# Patient Record
Sex: FEMALE | Race: WHITE | Employment: UNEMPLOYED | URBAN - NONMETROPOLITAN AREA
[De-identification: names, ages, dates, MRNs, and addresses within clinical notes are randomized per-mention and may not be internally consistent; named-entity substitution may affect disease eponyms.]

---

## 2020-05-21 ENCOUNTER — HOSPITAL ENCOUNTER (OUTPATIENT)
Dept: PHYSICAL THERAPY | Age: 60
Setting detail: THERAPIES SERIES
Discharge: HOME | End: 2020-05-21
Payer: MEDICAID

## 2020-05-21 PROCEDURE — 97163 PT EVAL HIGH COMPLEX 45 MIN: CPT

## 2020-05-21 NOTE — FLOWSHEET NOTE
** PLEASE SIGN, DATE AND TIME CERTIFICATION BELOW AND RETURN TO [facility] OUTPATIENT REHABILITATION (FAX #: [phone]). ATTEST/CO-SIGN IF ACCESSING VIA [employer]. THANK YOU.**    I certify that I have examined the patient below and determined that Physical Medicine and Rehabilitation service is necessary and that I approve the established plan of care for up to 90 days or as specifically noted. Attestation, signature or co-signature of physician indicates approval of certification requirements.    ________________________ ____________ __________  Physician Signature   Date   Time    [address] [facility]  LYMPHEDEMA SERVICES EVALUATION      Time In: 825  Time Out:   Total timed code minutes: 0  Total treatment minutes: 62  Therapy Whiteboard: LYMPHEDEMA LIFE IMPACT SCALE (Version 2): 36 (53%)    Date: 2020  Patient Name: Yuan Cespedes        CSN: [id number]   : 1960  (63 y.o.)  Gender: female   Referring Practitioner: DR. Yandy Bhatti, DPM  Diagnosis: LYMPHEDEMA HEREDITARY (Q82.0)  Treatment Diagnosis: Difficulty with prolonged standing and walking. Additional Pertinent Hx: The patient reported that in  she was hospitalized due to wounds and infection. She was diagnosed with Hereditary Lymphedema and underwent treatment. Insurance:  [employer]   Total # Visits Approved: (NO PRECERT UNTIL AFTER 80PV VISIT.) Total # Visits to Date: 1   Certification Date:  Progress Note Counter:1/10 (INITIAL EVALUATION)   Onset Date:  Hereditary Chart Reviewed: Yes   Date of Physician Follow-Up:  -- --     Pertinent Medical History:  No history of CHF, acute DVT, Diabetes, kidney problems or acute infections    History of Cancer:       No history of cancer    Prior Lymphedema Treatment:  Received Complete Decongestive Therapy/Manual Lymph Drainage.   Dates of Treatment:   Compression stockings (worn daily), application of compression bandages (nightly)  Compression pump (2014)  Elevation (Minimal benefits)-Adjustable bed with the ability to elevate lower extremities. Restrictions/Precautions:  Fall risk, Universal precautions    Pain:    -LOCATION: Bilateral lower extremities. -RATING (On 0-10 pain scale; 0 = no pain; 10 = worst pain):  5/10  -DESCRIPTORS: Aching and Heaviness. SUBJECTIVE:     The patient is a pleasant 63year old female who presented to the Lymphedema clinic on this date with severe bilateral lower extremity and lower truncal Lymphedema swelling. She reported that she has been working on her home Lymphedema program including self-MLD, Skin care, wearing compression stockings daily. She stated that the swelling has gotten worse and she is having more difficulty with keeping the swelling down which is leading to decrease in functional mobility. OBJECTIVE  Social/Functional History and Current Status:  Justo Dunbar lives alone in single story with basement with stairs to enter with handrails. Task Previous Current   LOWER EXTREMITY  Range of Motion Deficit  Large lobes at knee and ankle regions. Deficit  Large lobes at knee and ankle regions. LOWER EXTREMITY  Strength WFL Difficulty with lifting legs up onto treatment table, requiring minimal assist from therapist.    [facility] [place]  Patient was able to differentiate between right and left lower extremities with light touch. ADL'S   Bathing Independent Modified Independent requiring use of long handle scrub brush. Patient reported she has difficulty with prolonged standing to take her showers. Dressing Independent Modified independent requiring use of sock aide and shoe horn. Toileting Independent Independent   Homemaking Independent Modified independence due to requiring frequent rest breaks due to difficulty with prolonged standing and walking. Patient's children assist with retrieving items from basement.    Equipment Used NA NA   Functional Mobility   Ambulation Independent Modified infection. Patient/family/caregiver will demonstrate applying compression bandages with no greater than moderate assist and verbal cues from the therapist working towards being modified independent with applying the compression bandages for night time swelling. Long-Term Goals to be met in: 12 weeks. * Lymphedema swelling will stabilize as noted by total circumferential changes being no greater than 3.0-5.0 cm in preparation for being measured for new compression garment(s) to be worn daily to keep swelling down. * Patient/family/caregiver will demonstrate applying compression bandages with modified independence to apply at night to keep swelling down overnight to be able to luc compression stockings in the morning. * Patient/family/caregiver will demonstrate donning/doffing compression garments with modified independence to wear compression garments daily to keep swelling down. * Patient/family/caregiver will verbalize a good understanding regarding proper wearing and replacement schedule, precautions and care of garment(s). *  Patient will demonstrate a decrease in swelling by 5 points (31 or less) in the Lymphedema Life Impact Scale demonstrating improved functional mobility.     Everardo Kc, P.T. #[id number], DOT, MEEK     5/21/2020

## 2020-05-22 ENCOUNTER — HOSPITAL ENCOUNTER (OUTPATIENT)
Dept: PHYSICAL THERAPY | Age: 60
Setting detail: THERAPIES SERIES
End: 2020-05-22
Payer: MEDICAID

## 2020-05-26 ENCOUNTER — HOSPITAL ENCOUNTER (OUTPATIENT)
Dept: PHYSICAL THERAPY | Age: 60
Setting detail: THERAPIES SERIES
Discharge: HOME | End: 2020-05-26
Payer: MEDICAID

## 2020-05-26 PROCEDURE — 97140 MANUAL THERAPY 1/> REGIONS: CPT

## 2020-05-26 NOTE — PROGRESS NOTES
[facility]  LYMPHEDEMA SERVICES  DAILY NOTE  PHYSICAL THERAPY      Time In: 9715  Time Out: 5132  Total timed code minutes: 64  Total treatment minutes: 64     Date: 2020  Patient Name: Jyothi Santo        CSN: [id number]   : 1960  (63 y.o.)  Gender: female   Referring Practitioner: DR. Carly Davis DPM  Diagnosis: LYMPHEDEMA  Treatment Diagnosis: Difficulty with prolonged standing and walking. Additional Pertinent Hx: The patient reported that in  she was hospitalized due to wounds and infection. She was diagnosed with Hereditary Lymphedema and underwent treatment. Insurance:  [employer]   Total # Visits Approved: (NO PRECERT UNTIL AFTER  VISIT.) Total # Visits to Date: 2   Certification Date: 14 Progress Note Counter:2/10   Date of Physician Follow-Up:  -- Evaluation Date: Referral Date : 20     Pain:    -LOCATION: Bilateral lower extremities. -RATING (On 0-10 pain scale; 0 = no pain; 10 = worst pain):  3/10  -DESCRIPTORS: Aching and (\"It's worse when I walk\" per patient.)  SUBJECTIVE:     2020 The patient presented to the Lymphedema clinic on this date with her old compression stockings on, with tape on the right upper border to hold the stocking up. She denied changes in medical status since initial evaluation on 2020.   Manual Lymph Drainage  Unilateral Lower Extremity Manual Lymph Drainage (MLD):   Right  Trunk:  Effleurage, Profundus, Terminus, Inguinal Lnn, Axillary Lnn and (IA) Anterior Inguinal to Axillary (3 steps)       Lower Extremity:  Thigh (Anterior, Posterior, Lateral, Medial to Lateral), Knee (Anterior, Posterior, Lateral, Medial, Lower Leg (Anterior,Posterior), Ankle (Anterior, Posterior, Lateral, Medial and Foot/Toes (Anterior, Posterior)       Rework  Lower Extremity (Toes to Groin), Inguinal Lnn, Axillary Lnn, (IA) Anterior Inguinal to Axillary, Terminus, Profundus and Effleurage    Skin Care Measures  Washed involved circumferential measurements of the affected extremity by 10 cm working towards the lymphedema swelling stabilizing and patient being able to get measured and fitted for a new compression garment to be worn daily to keep swelling down. X  Patient will tolerate wearing the compression bandages from one treatment session until the next treatment session working towards meeting short-term goal #1. X Patient/family/caregiver will demonstrate and verbalize 3-5 skin care precautionary measures to decrease dry skin and risk of infection. X Patient/family/caregiver will demonstrate applying compression bandages with no greater than moderate assist and verbal cues from the therapist working towards being modified independent with applying the compression bandages for night time swelling.      Rigoberto Yu P.T. #[id number], DOT, MEEK    5/26/2020

## 2020-05-28 ENCOUNTER — HOSPITAL ENCOUNTER (OUTPATIENT)
Dept: PHYSICAL THERAPY | Age: 60
Setting detail: THERAPIES SERIES
Discharge: HOME | End: 2020-05-28
Payer: MEDICAID

## 2020-05-28 PROCEDURE — 97140 MANUAL THERAPY 1/> REGIONS: CPT

## 2020-05-28 NOTE — PROGRESS NOTES
leg/above ankle-medially and anteriorly) LOCATION # 2  (Dorsum of left foot)   COLOR OF TAPE Beige/Black Beige   AMOUNT OF STRETCH Less than 25% Less than 10%   ANCHOR Proximally Proximally   FINGERS EXTENDING. . Distally Distally   OTHER 3-4 squares 2.5 squares   -Patient denied pain/irritation during/following treatment. Compression Bandaging  Location: Left lower extremity (Metatarsal heads to knee joint line)  Compression Gradient: Moderate to Minimal  Supplies (per involved extremity):   Stockinette: Size: 4 inches, Thickness: soft, thick   Transelast none   10 cm Artiflex Cotton 1 roll   15 cm Artiflex Cotton none   4 cm Rosidal K none   6 cm Rosidal K 1 roll   8 cm Rosidal K none   10 cm Rosidal K 1 roll   12 cm Rosidal K none   Rosidal Soft 1 roll   Medigrip compression stockinette   -(Size E)-Metatarsal heads to distal 1/3 of lower leg. -(Size F)-Heel to knee joint line. Decongestive/Therapeutic Exercise  The patient was able to complete Decongestive Therapy exercises (x 10 reps) including ankle pumps. The exercises were completed with compression bandages on, without complaints of pain from the patient. The Decongestive Therapy exercises assist with decreasing the swelling by increasing the muscle pumping action of the lymph collectors and by increasing the fluid uptake in the lymphatic system. Treatment tolerance: Patient tolerance of evaluation: good   ASSESSMENT  Assessment:  Patient progressing toward goal achievement. Patient Education:Plan of Care, Home Exercise Program, Precautions/Restrictions, Equipment Education,  - Patient Verbalized Understanding, - Patient Requires Continued Education  -05/28/2020: Reviewed plan of care, goals and precautions for compression bandages. Plan: Week of June 1 (2x). . Manual Lymph Drainage (MLD), Skin Care, Compression, Exercises, Education/Training.     THE PATIENT DEMONSTRATES GOOD POTENTIAL TO MAKE GOOD PROGRESS AND MEET ALL GOALS WITH TREATMENTS

## 2020-06-02 ENCOUNTER — HOSPITAL ENCOUNTER (OUTPATIENT)
Dept: PHYSICAL THERAPY | Age: 60
Setting detail: THERAPIES SERIES
Discharge: HOME | End: 2020-06-02
Payer: MEDICAID

## 2020-06-02 PROCEDURE — 97140 MANUAL THERAPY 1/> REGIONS: CPT

## 2020-06-02 NOTE — PROGRESS NOTES
(Anterior,Posterior), Ankle (Anterior, Posterior, Lateral, Medial and Foot/Toes (Anterior, Posterior)       Rework  Lower Extremity (Toes to Groin), Inguinal Lnn, Axillary Lnn, (IA) Anterior Inguinal to Axillary, Terminus, Profundus and Effleurage    Skin Care Measures  Washed involved extremity/body part  -Applied Original Eucerin lotion to bilateral lower extremities. KINESIOTAPING    -Kinesiotape intact on left LE. LOCATION # 1  (Distal lower right leg/above ankle-medially/laterally and anteriorly) LOCATION # 2  (Dorsum of left foot)   COLOR OF TAPE Beige Beige   AMOUNT OF STRETCH Less than 10% Less than 10%   ANCHOR Proximally Proximally   FINGERS EXTENDING. . Distally Distally   OTHER 3-4 squares 2.5 squares   -Patient denied pain/irritation during/following treatment. Compression Bandaging  Location: Bilateral lower extremities (Metatarsal heads to knee joint line)  Compression Gradient: Moderate to Minimal  Supplies (per involved extremity):   Stockinette: Size: 4 inches, Thickness: soft, thick   Transelast none   10 cm Artiflex Cotton 1 roll   15 cm Artiflex Cotton none   4 cm Rosidal K none   6 cm Rosidal K 1 roll   8 cm Rosidal K none   10 cm Rosidal K 1 roll   12 cm Rosidal K none   Rosidal Soft 1 roll   Medigrip compression stockinette   -(Size E)-Metatarsal heads to distal 1/3 of lower leg. -(Size F)-Heel to knee joint line. -Applied a 1/4 inch thick grey foam pad to medial aspect of bilateral lower legs to decrease skin irritation at medial knee crease and to fill in the concaved area. Decongestive/Therapeutic Exercise  The patient was able to complete Decongestive Therapy exercises (x 10 reps) including ankle pumps. The exercises were completed with compression bandages on, without complaints of pain from the patient.  The Decongestive Therapy exercises assist with decreasing the swelling by increasing the muscle pumping action of the lymph collectors and by increasing the fluid uptake in the

## 2020-06-05 ENCOUNTER — HOSPITAL ENCOUNTER (OUTPATIENT)
Dept: PHYSICAL THERAPY | Age: 60
Setting detail: THERAPIES SERIES
Discharge: HOME | End: 2020-06-05
Payer: MEDICAID

## 2020-06-05 PROCEDURE — 97140 MANUAL THERAPY 1/> REGIONS: CPT

## 2020-06-05 NOTE — PROGRESS NOTES
[facility] 61  LYMPHEDEMA SERVICES  DAILY NOTE  PHYSICAL THERAPY      Time In: 0800  Time Out: 09  Total timed code minutes: 86  Total treatment minutes: 86     Date: 2020  Patient Name: Mal Hassan        CSN: [id number]   : 1960  (63 y.o.)  Gender: female   Referring Practitioner: DR. Silva Chadwick DPM  Diagnosis: LYMPHEDEMA  Treatment Diagnosis: Difficulty with prolonged standing and walking. Additional Pertinent Hx: The patient reported that in  she was hospitalized due to wounds and infection. She was diagnosed with Hereditary Lymphedema and underwent treatment. Insurance:  [employer]   Total # Visits Approved: (NO PRECERT UNTIL AFTER 99ON VISIT.) Total # Visits to Date: 5   Certification Date:  Progress Note Counter:5/10   Date of Physician Follow-Up:  -- Evaluation Date: Referral Date : 20     Pain: Patient Currently in Pain: Yes  -LOCATION: Bilateral lower extremities. -RATING (On 0-10 pain scale; 0 = no pain; 10 = worst pain):  4/10  -DESCRIPTORS: Heaviness in legs (bilaterally); Achiness reported in bilateral knees. SUBJECTIVE:     2020 The patient presented to the Lymphedema clinic on this date with compression bandages on bilateral lower extremities. She denied pain from bandages.       Measurements:   Left Right   Calf (Largest) 59.3 cm 56.1 cm   Ankle (Smallest) 44.1 cm 44.0 cm   Arch (Lateral Styloid) 25.9 cm 26.6 cm   Length (Ankle to knee joint line) 30.0 cm 29.0 cm     Manual Lymph Drainage  Unilateral Lower Extremity Manual Lymph Drainage (MLD):   Left  Trunk:  Effleurage, Profundus, Terminus, Inguinal Lnn, Axillary Lnn and (IA) Anterior Inguinal to Axillary (3 steps)       Lower Extremity:  Thigh (Anterior, Posterior, Lateral, Medial to Lateral), Knee (Anterior, Posterior, Lateral, Medial, Lower Leg (Anterior,Posterior), Ankle (Anterior, Posterior, Lateral, Medial and Foot/Toes (Anterior, Posterior)       Rework  Lower Extremity (Toes to

## 2020-06-09 ENCOUNTER — HOSPITAL ENCOUNTER (OUTPATIENT)
Dept: PHYSICAL THERAPY | Age: 60
Setting detail: THERAPIES SERIES
Discharge: HOME | End: 2020-06-09
Payer: MEDICAID

## 2020-06-09 PROCEDURE — 97140 MANUAL THERAPY 1/> REGIONS: CPT

## 2020-06-09 PROCEDURE — 97760 ORTHOTIC MGMT&TRAING 1ST ENC: CPT

## 2020-06-09 NOTE — PROGRESS NOTES
the therapist working towards being modified independent with applying the compression bandages for night time swelling.      Lawson Gowers, P.T. #[id number], DOT, MEEK    6/9/2020

## 2020-06-11 ENCOUNTER — APPOINTMENT (OUTPATIENT)
Dept: PHYSICAL THERAPY | Age: 60
End: 2020-06-11
Payer: MEDICAID

## 2020-06-16 ENCOUNTER — HOSPITAL ENCOUNTER (OUTPATIENT)
Dept: PHYSICAL THERAPY | Age: 60
Setting detail: THERAPIES SERIES
Discharge: HOME | End: 2020-06-16
Payer: MEDICAID

## 2020-06-16 PROCEDURE — 97140 MANUAL THERAPY 1/> REGIONS: CPT

## 2020-06-16 NOTE — PROGRESS NOTES
region.)   (A) Right: Decreased by 12.1 cm. Left: Decreased by 0.5 cm. (B)  Right: Increased by 3.4 cm. Left: Increased by 3.5 cm. Skin Care Measures  Washed involved extremity/body part  -Applied Original Eucerin lotion to bilateral lower extremities. COMPRESSION GARMENT RE-ASSESSMENT      -BRAND: Parking Panda WRAPS     -TYPE: Lower leg and foot/ankle components. -COMPRESSION GRADIENT: Adjustable (Recommend no greater than 30-40 mm Hg)     -GARMENT SIZE: XL (Regular)     -ADDITIONAL INFORMATION ON GARMENT: Donned Liner (Under sleeve) Extra Wide. -HAS A GOOD FIT BEEN ACHIEVED: Yes (Patient denied pain from compression garments    Decongestive/Therapeutic Exercise  The patient was able to complete Decongestive Therapy exercises (x 10 reps) including ankle pumps. The exercises were completed with compression bandages on, without complaints of pain from the patient. The Decongestive Therapy exercises assist with decreasing the swelling by increasing the muscle pumping action of the lymph collectors and by increasing the fluid uptake in the lymphatic system. Treatment tolerance: Patient tolerance of evaluation: good   ASSESSMENT  Assessment:  Patient progressing toward goal achievement. Refer to measurements above.    Patient Education:Plan of Care, Home Exercise Program, Precautions/Restrictions, Equipment Education,  - Patient Verbalized Understanding, - Patient Requires Continued Education  -06/16/2020: Reviewed plan of care and recommendations for obtaining a compression garment for proximal lower extremities and a new advanced sequential compression pump to include the lower truncal region for home use.    -06/09/2020: Refer to \"Compression Garment Assessment & Fitting (Left lower extremity) and Reassessment (RIght lower extremity) above.  -06/05/2020: Reviewed plan of care with progressing with application of left lower extremity compression garment and refer to [employer]

## 2020-06-18 ENCOUNTER — APPOINTMENT (OUTPATIENT)
Dept: PHYSICAL THERAPY | Age: 60
End: 2020-06-18
Payer: MEDICAID

## 2020-06-23 ENCOUNTER — HOSPITAL ENCOUNTER (OUTPATIENT)
Dept: PHYSICAL THERAPY | Age: 60
Setting detail: THERAPIES SERIES
Discharge: HOME | End: 2020-06-23
Payer: MEDICAID

## 2020-06-23 PROCEDURE — 97140 MANUAL THERAPY 1/> REGIONS: CPT

## 2020-06-30 ENCOUNTER — APPOINTMENT (OUTPATIENT)
Dept: PHYSICAL THERAPY | Age: 60
End: 2020-06-30
Payer: MEDICAID

## 2020-07-28 ENCOUNTER — HOSPITAL ENCOUNTER (OUTPATIENT)
Dept: PHYSICAL THERAPY | Age: 60
Setting detail: THERAPIES SERIES
Discharge: HOME | End: 2020-07-28
Payer: MEDICAID

## 2020-08-11 ENCOUNTER — HOSPITAL ENCOUNTER (OUTPATIENT)
Dept: PHYSICAL THERAPY | Age: 60
Setting detail: THERAPIES SERIES
Discharge: HOME | End: 2020-08-11
Payer: MEDICAID

## 2020-08-11 PROCEDURE — 97140 MANUAL THERAPY 1/> REGIONS: CPT

## 2020-08-11 NOTE — PROGRESS NOTES
[facility]  LYMPHEDEMA SERVICES  DAILY NOTE  PHYSICAL THERAPY      Time In: 1000  Time Out: 1055  Total timed code minutes: 55  Total treatment minutes: 55     Date: 2020  Patient Name: Negrita Pang        CSN: [id number]   : 1960  (63 y.o.)  Gender: female   Referring Practitioner: LINSEY CarvajalM  Diagnosis: LYMPHEDEMA  Treatment Diagnosis: Difficulty with prolonged standing and walking. Additional Pertinent Hx: The patient reported that in  she was hospitalized due to wounds and infection. She was diagnosed with Hereditary Lymphedema and underwent treatment. Insurance:  [employer]   Total # Visits Approved: (NO PRECERT UNTIL AFTER 67CE VISIT.) Total # Visits to Date: 9   Certification Date:  Progress Note Counter:9/10   Date of Physician Follow-Up:  -- Evaluation Date: Referral Date : 20     Pain: Patient Currently in Pain: Yes  -LOCATION: Bilateral lower extremities. -RATING (On 0-10 pain scale; 0 = no pain; 10 = worst pain):  5/10  -DESCRIPTORS: Heaviness in legs (bilaterally); Achiness reported in bilateral knees. SUBJECTIVE:     2020 The patient presented to the Lymphedema clinic on this date with compression garments off. She denied pain from her compression garments.       OBJECTIVE  Circumferential Measurements:   Left Right   (A) Metatarsal heads to knee jt line (30 cm) 374.3 cm 362.4 cm   (B) Knee jt line to proximal thigh (35-55 cm) 369.1 cm 377.8 cm     Bilateral Lower Extremity Manual Lymph Drainage (MLD):     Trunk:   Effleurage, Profundus, Terminus, Axillary Lnn and (IA) Anterior Inguinal to Axillary (3 steps)       Lower Extremity:  Thigh (Anterior, Posterior, Lateral, Medial to Lateral), Knee (Anterior, Posterior, Lateral, Medial, Lower Leg (Anterior,Posterior), Ankle (Anterior, Posterior, Lateral, Medial and Foot/Toes (Anterior, Posterior)       Rework  Lower Extremity (Toes to Groin), Axillary Lnn, (IA) Anterior Inguinal to Axillary, Terminus, Profundus and Effleurage    PHYSIOTOUCH  -TREATMENT PRESSURE: 120 mm Hg  -CONTINUOUS/PULSED: Pulsed  -FREQUENCY:  60 Hz  -SECONDS: 2 s  -PERCENTAGE: 50 %  -DURATION: 5 pulsations along left lower leg (medially/laterally/anteriorly)  -OTHER: Large cups used. -GOAL: Decongestion  -PATIENT TOLERANCE: Patient denied pain during/following Physiotouch treatment. Skin Care Measures*  Washed involved extremity/body part  -Applied Original Eucerin lotion to bilateral lower extremities. COMPRESSION GARMENT RE-ASSESSMENT      -BRAND: Color[employer]damÂ®T Typesafe WRAPS     -TYPE: Lower leg and foot/ankle components. -COMPRESSION GRADIENT: Adjustable (Recommend no greater than 30-40 mm Hg)     -GARMENT SIZE: XL (Regular)     -ADDITIONAL INFORMATION ON GARMENT: Donned Liner (Under sleeve) Extra Wide. -HAS A GOOD FIT BEEN ACHIEVED: Yes (Patient denied pain from compression garments    Decongestive/Therapeutic Exercise  The patient was able to complete Decongestive Therapy exercises (x 10 reps) including ankle pumps. The exercises were completed with compression bandages on, without complaints of pain from the patient. The Decongestive Therapy exercises assist with decreasing the swelling by increasing the muscle pumping action of the lymph collectors and by increasing the fluid uptake in the lymphatic system. Treatment tolerance: Patient tolerance of evaluation: good   ASSESSMENT  Assessment:  Patient progressing toward goal achievement. However, increased swelling in regards to total circumference by a total of 16.0 cm and 12.7 cm in the right an left lower extremities, respectively. Patient Education:Plan of Care, Home Exercise Program, Precautions/Restrictions, Equipment Education,  - Patient Verbalized Understanding, - Patient Requires Continued Education  -08/11/2020: Reviewed plan of care, waiting on new compression garments with wraps for bilateral lower extremities.   -06/23/2020: Refer to NOTE above under \"Compression Garment Re-Assessment\"-included for binder.   -06/16/2020: Reviewed plan of care and recommendations for obtaining a compression garment for proximal lower extremities and a new advanced sequential compression pump to include the lower truncal region for home use.    -06/09/2020: Refer to \"Compression Garment Assessment & Fitting (Left lower extremity) and Reassessment (RIght lower extremity) above.  -06/05/2020: Reviewed plan of care with progressing with application of left lower extremity compression garment and refer to \"Compression Garment Assessment and Fitting\" above.  -06/02/2020: Reviewed plan of care and precautions with the addition of bandages on bilateral lower extremities (balacne and safety awareness).  -05/28/2020: Reviewed plan of care, goals and precautions for compression bandages. Plan: Week of June 23 (1x). . Manual Lymph Drainage (MLD), Skin Care, Compression, Exercises, Education/Training. Patient may benefit from new sequential compression pump to accommodate the changes in her Lymphedema and also compression garments with velcro for knee/thigh region. THE PATIENT DEMONSTRATES GOOD POTENTIAL TO MAKE GOOD PROGRESS AND MEET ALL GOALS WITH TREATMENTS COMPLETED BY A SKILLED PHYSICAL THERAPIST/PHYSICAL THERAPIST ASSISTANT. GOALS   SHORT-TERM GOALS:  to be met in 6 weeks   X  Patient will demonstrate a decrease in circumferential measurements of the affected extremity by 10 cm working towards the lymphedema swelling stabilizing and patient being able to get measured and fitted for a new compression garment to be worn daily to keep swelling down. X  MET  Patient will tolerate wearing the compression bandages from one treatment session until the next treatment session working towards meeting short-term goal #1. X  MET  ONGOING Patient/family/caregiver will demonstrate and verbalize 3-5 skin care precautionary measures to decrease dry skin and risk of infection. X Patient/family/caregiver will demonstrate applying compression bandages with no greater than moderate assist and verbal cues from the therapist working towards being modified independent with applying the compression bandages for night time swelling.      [address] [place] [address], P.T. #[id number], MEEK CHRIS    8/11/2020

## 2020-09-23 ENCOUNTER — APPOINTMENT (OUTPATIENT)
Dept: PHYSICAL THERAPY | Age: 60
End: 2020-09-23
Payer: MEDICAID

## 2020-09-25 ENCOUNTER — HOSPITAL ENCOUNTER (OUTPATIENT)
Dept: PHYSICAL THERAPY | Age: 60
Setting detail: THERAPIES SERIES
Discharge: HOME | End: 2020-09-25
Payer: MEDICAID

## 2020-09-25 PROCEDURE — 97761 PROSTHETIC TRAING 1ST ENC: CPT

## 2020-09-25 PROCEDURE — 97140 MANUAL THERAPY 1/> REGIONS: CPT

## 2020-09-25 NOTE — PROGRESS NOTES
[facility] 61  LYMPHEDEMA SERVICES  DAILY NOTE  PHYSICAL THERAPY      Time In: 915  Time Out: 2268  Total timed code minutes: 40  Total treatment minutes: 40     Date: 2020  Patient Name: Zach Bright        CSN: [id number]   : 1960  (62 y.o.)  Gender: female   Referring Practitioner: DR. Yamile Hunt DPM  Diagnosis: LYMPHEDEMA  Treatment Diagnosis: Difficulty with prolonged standing and walking. Additional Pertinent Hx: The patient reported that in  she was hospitalized due to wounds and infection. She was diagnosed with Hereditary Lymphedema and underwent treatment. Insurance:  [employer]   Total # Visits Approved: (NO PRECERT UNTIL AFTER 44ND VISIT.) Total # Visits to Date: 10   Certification Date:  Progress Note Counter:10   Date of Physician Follow-Up:  -- Evaluation Date: Referral Date : 20     Pain: Patient Currently in Pain: Yes  -LOCATION: Bilateral lower extremities. -RATING (On 0-10 pain scale; 0 = no pain; 10 = worst pain):  4/10  -DESCRIPTORS: Heaviness in legs (bilaterally); Achiness reported in bilateral knees. SUBJECTIVE:     2020 The patient presented to the Lymphedema clinic on this date with compression garments with velcro on bilateral lower legs. She stated that she has been wearing the compression garments on the lower legs and she continues to present with severe bilateral lower extremities. COMPRESSION GARMENT ASSESSMENT AND FITTING     -BRAND: [employer]enna[employer] FARROW WRAPS     -TYPE: Bilateral knee and thigh components. -COMPRESSION GRADIENT: Adjustable (No greater than 30-40 mm Hg)     -GARMENT SIZE: XL Tall     -HAS A GOOD FIT BEEN ACHIEVED: Yes       -The patient required moderate hands on assistance from the therapist, and minimal verbal cues for proper donning.       -The patient is able to proceed with wearing the compression garment home.   She has been instructed to wear the compression garments on bilateral lower

## 2020-11-08 NOTE — DISCHARGE SUMMARY
[facility] 61  LYMPHEDEMA SERVICES  DISCHARGE NOTE  PHYSICAL THERAPY      Time In: 0  Time Out: 0  Total timed code minutes: 0  Total treatment minutes: 0     Date: 2020  Patient Name: Bari Evans        CSN: [id number]   : 1960  (62 y.o.)  Gender: female   Referring Practitioner: DR. Gill Escobedo DPM  Diagnosis: LYMPHEDEMA  Treatment Diagnosis: Difficulty with prolonged standing and walking. Additional Pertinent Hx: The patient reported that in  she was hospitalized due to wounds and infection. She was diagnosed with Hereditary Lymphedema and underwent treatment. Insurance:  [employer]   Total # Visits Approved: (NO PRECERT UNTIL AFTER 68TD VISIT.) Total # Visits to Date: 10   Certification Date:  Progress Note Counter:10   Date of Physician Follow-Up:  -- Evaluation Date: Referral Date : 20     ASSESSMENT  Assessment:       The patient is a pleasant and cooperative 62year old female who has made good progress with the Complete Decongestive Therapy/Manual Lymph Drainage (CDT/MLD) treatments as noted by the decrease in swelling in bilateral lower extremities. She reported that she has received her new compression pump, but they only mailed her one leg. The therapist contacted the company for the sleeve for the other leg. She has tolerated wearing her new compression garments with velcro and is able to get around better. The patient has progressed through Phases I & II working towards becoming modified independent prior to discharge to home management. Patient Education:Plan of Care, Home Exercise Program, Precautions/Restrictions, Equipment Education,  - Patient Verbalized Understanding, - Patient Requires Continued Education  -2020: Refer to \"Compression Garment Assessment and Fitting\" above.   -2020: Reviewed plan of care, waiting on new compression garments with wraps for bilateral lower extremities.   -2020: Refer to NOTE above under \"Compression Garment Re-Assessment\"-included for binder.   -06/16/2020: Reviewed plan of care and recommendations for obtaining a compression garment for proximal lower extremities and a new advanced sequential compression pump to include the lower truncal region for home use. Plan:  -THE PATIENT IS BEING DISCHARGED FROM THE LYMPHEDEMA PROGRAM/CLINIC AT THIS TIME. SHE WILL CONTINUE WITH HER HOME PROGRAM WHICH INCLUDES: MANUAL LYMPH DRAINAGE (MLD)-SEQUENTIAL COMPRESSION PUMP, SKIN CARE, COMPRESSION GARMENTS WITH VELCRO AND EXERCISING. GOALS   SHORT-TERM GOALS:  to be met in 6 weeks   X  MET  Patient will demonstrate a decrease in circumferential measurements of the affected extremity by 10 cm working towards the lymphedema swelling stabilizing and patient being able to get measured and fitted for a new compression garment to be worn daily to keep swelling down. X  MET  Patient will tolerate wearing the compression bandages from one treatment session until the next treatment session working towards meeting short-term goal #1. X  MET   Patient/family/caregiver will demonstrate and verbalize 3-5 skin care precautionary measures to decrease dry skin and risk of infection. X  MET  (Compression garments with velcro) Patient/family/caregiver will demonstrate applying compression bandages with no greater than moderate assist and verbal cues from the therapist working towards being modified independent with applying the compression bandages for night time swelling. LONG-TERM GOALS:  to be met in 12 weeks   X  MET 1. Lymphedema swelling will stabilize as noted by total circumferential changes being no greater than 3.0-5.0 cm in preparation for being measured for new compression garment(s) to be worn daily to keep swelling down. X  MET  (Compression garments with velcro) 2.  Patient/family/caregiver will demonstrate applying compression bandages with modified independence to apply at night to keep swelling down overnight to be able to luc compression stockings in the morning. X  MET  (Compression garments with velcro) 3. Patient/family/caregiver will demonstrate donning/doffing compression garments with modified independence to wear compression garments daily to keep swelling down. X  MET 4. Patient/family/caregiver will verbalize a good understanding regarding proper wearing and replacement schedule, precautions and care of garment(s). X  MET       5. Patient will demonstrate a decrease in swelling by 5 points (31 or less) in the Lymphedema Life Impact Scale demonstrating improved functional mobility. Jaziel JARQUIN, DAYNA FOR ALLOWING US TO ASSIST IN THE CARE AND TREATMENT OF THIS PATIENT!     [address] [place] [address], [id number], MEEK CHRIS    11/8/2020